# Patient Record
(demographics unavailable — no encounter records)

---

## 2017-01-16 NOTE — RADRPT
EXAM DATE/TIME:  01/16/2017 00:22 

 

[facility] COMPARISON:     

No previous studies available for comparison.

 

                     

INDICATIONS :     

Pt having chest pain and elevated blood glucose. 

                     

 

MEDICAL HISTORY :     

Hypertension.  Diabetes mellitus type II.  Myocardial infarction.   TIA   

 

SURGICAL HISTORY :     

None.   

 

ENCOUNTER:     

Initial                                        

 

ACUITY:     

1 day      

 

PAIN SCORE:     

8/10

 

LOCATION:     

Bilateral chest 

 

FINDINGS:     

A single view of the chest demonstrates the lungs to be symmetrically aerated without evidence of mas
s, infiltrate or effusion.  The cardiomediastinal contours are unremarkable.  Osseous structures are 
intact.

 

CONCLUSION:     No acute disease.  

 

 

 

 Shashank Jarquin MD on January 16, 2017 at 0:31           

Board Certified Radiologist.

 This report was verified electronically.

## 2017-01-16 NOTE — EKG
Date Performed: 01/16/2017       Time Performed: 01:14:14

 

PTAGE:      51 years

 

EKG:      Sinus rhythm 

 

 WITH FIRST DEGREE AV BLOCK SEPTAL MYOCARDIAL INFARCTION ABNORMAL ECG 

 

NO PREVIOUS TRACING            

 

DOCTOR:   Keith Griffin  Interpretating Date/Time  01/16/2017 19:21:09